# Patient Record
(demographics unavailable — no encounter records)

---

## 2024-12-02 NOTE — ASSESSMENT
[FreeTextEntry1] : 62 y/o male with Low testosterone and ED, here today for TRUS  The patient states that his symptoms are improved. Refuses any meds for ED. c/o frequent urination x 1month. Denies hematuria, dysuria, flank pain Eating spicy, citrus not adequately hydrating  Denies FHx PCA Tobacco use: never smoker  Last PSA: 3.28 ng/mL (03/04/2024)  Last Creatinine: 1.18 mg/dL (03/04/2024)  Last UCx: negative (05/13/2024) Uro meds: Tamsulosin, Dutasteride.  06/03/2024 - Prostate Volume: 57.8 cc Length : 47 mm Width : 48 mm Height : 50 mm Impression: Patient could not tolerate probe for transrectal and refused exam after insertion. Transabdominal ultrasound completed  06/03/2024 - Patient to start Dutasteride + Tamsulosin. Explained to the patient that dutasteride could cause loss of sexual desire and could be correlated to a decreased sensitivity of exams to prostate cancer (eg.: PSA)  12/02/2024 - TRUS Prostate Volume: 48 cc Impression: prostate gland decreased in size from prior US. As per prior examination patient could not tolerate probe for transrectal and refused exam after insertion. Transabdominal ultrasound completed again  US results are discussed with the patient. Renewal of Dutasteride + Tamsulosin Blood draw for PSA + renal panel Collecting urine for UA and Culture  Will F/U in 6 months for uroflow + US prostate to check for any improvement.

## 2024-12-02 NOTE — HISTORY OF PRESENT ILLNESS
[FreeTextEntry1] : 62 y/o male with Low testosterone and ED, here today for TRUS  The patient states that his symptoms are improved. Refuses any meds for ED. c/o frequent urination x 1month. Denies hematuria, dysuria, flank pain Eating spicy, citrus not adequately hydrating  Denies FHx PCA Tobacco use: never smoker  Last PSA: 3.28 ng/mL (03/04/2024)  Last Creatinine: 1.18 mg/dL (03/04/2024)  Last UCx: negative (05/13/2024) Uro meds: Tamsulosin, Dutasteride.  06/03/2024 - Prostate Volume: 57.8 cc Length : 47 mm Width : 48 mm Height : 50 mm Impression: Patient could not tolerate probe for transrectal and refused exam after insertion. Transabdominal ultrasound completed  06/03/2024 - Patient to start Dutasteride + Tamsulosin. Explained to the patient that dutasteride could cause loss of sexual desire and could be correlated to a decreased sensitivity of exams to prostate cancer (eg.: PSA)

## 2025-04-21 NOTE — HISTORY OF PRESENT ILLNESS
[FreeTextEntry1] : urine, bp [de-identified] : had ?microscopic ? hematuria on [facility] exam--had been taking beet juice until about 1 month ago prior urines at urology neg wt ;loss noted--watching intake

## 2025-06-04 NOTE — ASSESSMENT
[FreeTextEntry1] : 61y/o male with Low testosterone and ED, here today for TRUS  The patient states that his symptoms are improved. He didn't take dutasteride as instructed. Refuses any meds for ED. c/o frequent urination x 1month. Denies hematuria, dysuria, flank pain Eating spicy, citrus not adequately hydrating  Denies FHx PCA Tobacco use: never smoker  Last PSA: 3.28 ng/mL (03/04/2024)  Last Creatinine: 1.18 mg/dL (03/04/2024)  Last UCx: negative (05/13/2024) Uro meds: Tamsulosin, Dutasteride.  06/03/2024 - Prostate Volume: 57.8 cc Length : 47 mm Width : 48 mm Height : 50 mm Impression: Patient could not tolerate probe for transrectal and refused exam after insertion. Transabdominal ultrasound completed  06/03/2024 - Patient to start Dutasteride + Tamsulosin. Explained to the patient that dutasteride could cause loss of sexual desire and could be correlated to a decreased sensitivity of exams to prostate cancer (eg.: PSA)  12/02/2024 - TRUS Prostate Volume: 48 cc Impression: prostate gland decreased in size from prior US. As per prior examination patient could not tolerate probe for transrectal and refused exam after insertion. Transabdominal ultrasound completed again  06/04/2025 - TRUS Prostate Volume: 48 cc Impression: As per prior examination patient could not tolerate probe for transrectal and refused exam after insertion. patient refused today TRUS, Transabdominal ultrasound completed. prostate measuring same exact size as prior.  US results are discussed with the patient. Blood draw for PSA + Renal Panel Collecting urine for UA and Culture  Dutasteride is d/c Tamsulosin is renewed. Will F/U in 6 months for uroflow to check for any improvement.

## 2025-06-04 NOTE — HISTORY OF PRESENT ILLNESS
[FreeTextEntry1] : 63y/o male with Low testosterone and ED, here today for TRUS  The patient states that his symptoms are improved. He didn't take dutasteride as instructed. Refuses any meds for ED. c/o frequent urination x 1month. Denies hematuria, dysuria, flank pain Eating spicy, citrus not adequately hydrating  Denies FHx PCA Tobacco use: never smoker  Last PSA: 3.28 ng/mL (03/04/2024)  Last Creatinine: 1.18 mg/dL (03/04/2024)  Last UCx: negative (05/13/2024) Uro meds: Tamsulosin, Dutasteride.  06/03/2024 - Prostate Volume: 57.8 cc Length : 47 mm Width : 48 mm Height : 50 mm Impression: Patient could not tolerate probe for transrectal and refused exam after insertion. Transabdominal ultrasound completed  06/03/2024 - Patient to start Dutasteride + Tamsulosin. Explained to the patient that dutasteride could cause loss of sexual desire and could be correlated to a decreased sensitivity of exams to prostate cancer (eg.: PSA)  12/02/2024 - TRUS Prostate Volume: 48 cc Impression: prostate gland decreased in size from prior US. As per prior examination patient could not tolerate probe for transrectal and refused exam after insertion. Transabdominal ultrasound completed again